# Patient Record
Sex: FEMALE
[De-identification: names, ages, dates, MRNs, and addresses within clinical notes are randomized per-mention and may not be internally consistent; named-entity substitution may affect disease eponyms.]

---

## 2019-09-09 ENCOUNTER — HOSPITAL ENCOUNTER (OUTPATIENT)
Dept: HOSPITAL 88 - CT | Age: 74
End: 2019-09-09
Attending: UROLOGY
Payer: MEDICARE

## 2019-09-09 DIAGNOSIS — R31.21: Primary | ICD-10-CM

## 2019-09-09 LAB
BUN SERPL-MCNC: 18 MG/DL (ref 7–26)
BUN/CREAT SERPL: 20 (ref 6–25)
EGFRCR SERPLBLD CKD-EPI 2021: > 60 ML/MIN (ref 60–?)

## 2019-09-09 PROCEDURE — 84520 ASSAY OF UREA NITROGEN: CPT

## 2019-09-09 PROCEDURE — 36415 COLL VENOUS BLD VENIPUNCTURE: CPT

## 2019-09-09 PROCEDURE — 74178 CT ABD&PLV WO CNTR FLWD CNTR: CPT

## 2019-09-09 PROCEDURE — 82565 ASSAY OF CREATININE: CPT

## 2019-09-09 NOTE — DIAGNOSTIC IMAGING REPORT
EXAM: CT Abdomen and Pelvis WITH intravenous contrast - Hematuria protocol



INDICATION: Microscopic hematuria



COMPARISON: CT abdomen and pelvis of 6/20/2012



TECHNIQUE: Abdomen and pelvis were scanned utilizing a multidetector helical

scanner from the lung base to the pubic symphysis before and after

administration of IV contrast. Coronal and sagittal reformations were obtained.

Hematuria protocol was used. Scan was performed prior to contrast

administration and during portal venous phase. 

     

IV CONTRAST: 150 mL of Isovue 370

ORAL CONTRAST: Water

            

COMPLICATIONS: None



RADIATION DOSE:

Total DLP:  894.0 mGy*cm



Dose modulation, iterative reconstruction, and/or weight based adjustment of

the mA/kV was utilized to reduce the radiation dose to as low as reasonably

achievable. 



FINDINGS:

LOWER THORAX: 7 mm right middle lobe pulmonary nodule. Bibasilar dependent

subsegmental atelectasis.



HEPATOBILIARY: No focal hepatic lesions.  No biliary ductal dilatation. The

gallbladder appears unremarkable.



SPLEEN: No splenomegaly.



PANCREAS: No focal masses or ductal dilatation.



ADRENALS: No adrenal nodules.

KIDNEYS/URETERS: No hydronephrosis, stones, or solid mass lesions. Excretory

phase images demonstrate opacification of the majority of the ureters with no

filling defect to suggest urothelial mass lesion.

PELVIC ORGANS/BLADDER: Status post hysterectomy. The bladder fills with

contrast. No bladder wall thickening, bladder calculi, or mass lesion.



PERITONEUM / RETROPERITONEUM: No free air or fluid.

LYMPH NODES: No lymphadenopathy.

VESSELS: Unremarkable.



GI TRACT: No abnormal bowel thickening. No bowel obstruction. Status post

appendectomy.



BONES AND SOFT TISSUES: No acute osseous injury. Degenerative changes of the

lumbar spine. Grade 1 retrolisthesis at L1-2, L2-3 and L3-4. Grade 1

anterolisthesis at L4-5.



IMPRESSION: 

No renal calculi or hydronephrosis. No evidence of urothelial mass lesion.



7 mm right middle lobe pulmonary nodule. Recommend dedicated chest CT for

further evaluation for pulmonary nodules.





Signed by: Delfin Lawson MD on 9/9/2019 10:35 AM

## 2020-10-28 ENCOUNTER — HOSPITAL ENCOUNTER (EMERGENCY)
Dept: HOSPITAL 88 - FSED | Age: 75
Discharge: HOME | End: 2020-10-28
Payer: MEDICARE

## 2020-10-28 VITALS — HEIGHT: 60 IN | BODY MASS INDEX: 28.27 KG/M2 | WEIGHT: 144 LBS

## 2020-10-28 DIAGNOSIS — J01.90: Primary | ICD-10-CM

## 2020-10-28 DIAGNOSIS — J45.909: ICD-10-CM

## 2020-10-28 DIAGNOSIS — I10: ICD-10-CM

## 2020-10-28 DIAGNOSIS — R50.9: ICD-10-CM

## 2020-10-28 DIAGNOSIS — E78.5: ICD-10-CM

## 2020-10-28 DIAGNOSIS — E11.9: ICD-10-CM

## 2020-10-28 PROCEDURE — 99283 EMERGENCY DEPT VISIT LOW MDM: CPT

## 2020-10-28 NOTE — EMERGENCY DEPARTMENT NOTE
History of Present Illnes


History of Present Illness


Chief Complaint:  General Medicine Complaints


History of Present Illness


This is a 75 year old  female  Chief Complaint Comment sinus pressure, 

colored nasal drainage, 99 temp since friday. taking nyquil 


.


Historian:  Patient


Arrival Mode:  Car


Onset (how long ago):  day(s) (2)


Location:  facial


Quality:  dull


Radiation:  Denies non-radiation, Denies back, Denies neck, Denies extremity, 

Denies abdomen, Denies periumbilical, Denies flank, Denies proximal, Denies 

distal, Denies other


Severity:  moderate


Onset quality:  gradual


Duration (how long):  day(s) (2)


Timing of current episode:  constant


Progression:  waxing and waning


Chronicity:  new


Context:  Denies recent illness, Denies recent surgery, Denies recent 

immobilization, Denies recent travel, Denies trauma/injury, Denies new 

medications, Denies hx of DVT/PE, Denies non-compliance w/ medications, Denies 

other


Relieving factors:  none


Exacerbating factors:  none


Associated symptoms:  Denies denies other symptoms, Denies confusion, Denies 

chest pain, Denies cough, Denies diaphoresis, Denies fever/chills, Denies 

headaches, Denies loss of appetite, Denies malaise, Denies nausea/vomiting, 

Denies rash, Denies seizure, Denies shortness of breath, Denies syncope, Denies 

weakness, Denies other


Treatments prior to arrival:  none





Past Medical/Family History


Physician Review


I have reviewed the patient's past medical and family history.  Any updates have

been documented here.





Past Medical History


Recent Fever:  No


Clinical Suspicion of Infectio:  No


New/Unexplained Change in Ment:  No


Past Medical History:  Hypertension, Diabetes, Asthma, Hyperlipedemia


Past Surgical History:  Appendectomy, Hysterectomy, Orthopedic Implants





Social History


Smoking Cessation:  Never Smoker


Alcohol Use:  None


Any Illegal Drug Use:  No





Other


Any Pre-Existing Lines (PICC,:  No





Review of Systems


Review of Systems


Constitutional:  Reports no symptoms


EENTM:  Reports as per HPI


Cardiovascular:  Reports no symptoms


Respiratory:  Reports no symptoms; 


   Denies as per HPI, Denies change in phlegm color, Denies chest congestion, 

Denies cough, Denies hemoptysis, Denies excessive phlegm production, Denies pain

on inspiration, Denies pain with cough, Denies dyspnea, Denies dyspnea on 

exertion, Denies snoring, Denies stridor, Denies wheezing, Denies other


Gastrointestinal:  Reports no symptoms; 


   Denies as per HPI, Denies abdominal pain, Denies constipation, Denies 

diarrhea, Denies nausea, Denies vomiting, Denies other


Genitourinary:  Reports no symptoms; 


   Denies as per HPI, Denies discharge, Denies dysuria, Denies frequency, Denies

hematuria, Denies pain, Denies other


Musculoskeletal:  Reports no symptoms; 


   Denies as per HPI, Denies back pain, Denies gout, Denies joint pain, Denies 

joint swelling, Denies muscle pain, Denies muscle stiffness, Denies neck pain, 

Denies other


Integumentary:  Reports no symptoms; 


   Denies as per HPI, Denies change in color, Denies change in hair/nails, 

Denies dryness, Denies lesions, Denies lumps, Denies rash, Denies poor turgor, 

Denies ecchymosis, Denies other


Neurological:  Reports no symptoms


Psychological:  Reports no symptoms


Endocrine:  Reports no symptoms


Hematological/Lymphatic:  Reports no symptoms





Physical Exam


Related Data


Triage Vital Signs





Vital Signs








  Date Time  Temp Pulse Resp B/P (MAP) Pulse Ox O2 Delivery O2 Flow Rate FiO2


 


10/28/20 14:12 99.0 89 16 174/85 98 Room Air  








Vital signs reviewed:  Yes





Physical Exam


CONSTITUTIONAL





Constitutional:  Present well-developed, Present well-nourished


HENT


HENT:  Present normocephalic, Present atraumatic, Present oropharynx 

clear/moist, Present nose normal, Present nasal congestion


HENT L/R:  Present left ext ear normal, Present right ext ear normal


EYES





Eyes:  Reports PERRL, Reports conjunctivae normal


NECK


Neck:  Present ROM normal


PULMONARY


Pulmonary:  Present effort normal, Present breath sounds normal


CARDIOVASCULAR





Cardiovascular:  Present regular rhythm, Present heart sounds normal, Present 

capillary refill normal, Present normal rate


GASTROINTESTINAL





Abdominal:  Present soft, Present nontender, Present bowel sounds normal


GENITOURINARY





Genitourinary:  Present exam deferred


SKIN


Skin:  Present warm, Present dry


MUSCULOSKELETAL





Musculoskeletal:  Present ROM normal


NEUROLOGICAL





Neurological:  Present alert, Present oriented x 3, Present no gross motor or 

sensory deficits


PSYCHOLOGICAL


Psychological:  Present mood/affect normal, Present judgement normal





Assessment & Plan


Medical Decision Making


MDM


sinusutu s pharyngitis bronchitis





Reassessment


Reassessment time:  14:21


Reassessment


same





Assessment & Plan


Final Impression:  


(1) Acute sinusitis


Depart Disposition:  HOME, SELF-CARE


Last Vital Signs











  Date Time  Temp Pulse Resp B/P (MAP) Pulse Ox O2 Delivery O2 Flow Rate FiO2


 


10/28/20 14:12 99.0 89 16 174/85 98 Room Air  

















KRAIG ZAVALA MD             Oct 28, 2020 14:21

## 2020-12-16 ENCOUNTER — HOSPITAL ENCOUNTER (EMERGENCY)
Dept: HOSPITAL 88 - ER | Age: 75
Discharge: HOME | End: 2020-12-16
Payer: MEDICARE

## 2020-12-16 VITALS — HEIGHT: 64 IN | WEIGHT: 140 LBS | BODY MASS INDEX: 23.9 KG/M2

## 2020-12-16 VITALS — DIASTOLIC BLOOD PRESSURE: 70 MMHG | SYSTOLIC BLOOD PRESSURE: 130 MMHG

## 2020-12-16 DIAGNOSIS — E11.9: ICD-10-CM

## 2020-12-16 DIAGNOSIS — R60.9: ICD-10-CM

## 2020-12-16 DIAGNOSIS — M25.561: Primary | ICD-10-CM

## 2020-12-16 DIAGNOSIS — M17.11: ICD-10-CM

## 2020-12-16 DIAGNOSIS — M10.9: ICD-10-CM

## 2020-12-16 DIAGNOSIS — I10: ICD-10-CM

## 2020-12-16 DIAGNOSIS — E78.5: ICD-10-CM

## 2020-12-16 PROCEDURE — 93971 EXTREMITY STUDY: CPT

## 2020-12-16 PROCEDURE — 99283 EMERGENCY DEPT VISIT LOW MDM: CPT

## 2021-05-22 ENCOUNTER — HOSPITAL ENCOUNTER (EMERGENCY)
Dept: HOSPITAL 88 - ER | Age: 76
Discharge: HOME | End: 2021-05-22
Payer: MEDICARE

## 2021-05-22 VITALS — WEIGHT: 140 LBS | HEIGHT: 64 IN | BODY MASS INDEX: 23.9 KG/M2

## 2021-05-22 DIAGNOSIS — M54.9: ICD-10-CM

## 2021-05-22 DIAGNOSIS — M17.12: ICD-10-CM

## 2021-05-22 DIAGNOSIS — E11.9: ICD-10-CM

## 2021-05-22 DIAGNOSIS — M10.9: ICD-10-CM

## 2021-05-22 DIAGNOSIS — G89.29: ICD-10-CM

## 2021-05-22 DIAGNOSIS — M25.562: Primary | ICD-10-CM

## 2021-05-22 DIAGNOSIS — E78.5: ICD-10-CM

## 2021-05-22 DIAGNOSIS — I10: ICD-10-CM

## 2021-05-22 PROCEDURE — 99283 EMERGENCY DEPT VISIT LOW MDM: CPT
